# Patient Record
Sex: FEMALE | Race: WHITE | URBAN - METROPOLITAN AREA
[De-identification: names, ages, dates, MRNs, and addresses within clinical notes are randomized per-mention and may not be internally consistent; named-entity substitution may affect disease eponyms.]

---

## 2020-02-18 ENCOUNTER — NEW PATIENT (OUTPATIENT)
Dept: URBAN - METROPOLITAN AREA CLINIC 51 | Facility: CLINIC | Age: 70
End: 2020-02-18
Payer: MEDICARE

## 2020-02-18 DIAGNOSIS — G24.5 BLEPHAROSPASM: ICD-10-CM

## 2020-02-18 DIAGNOSIS — H16.223 KERATOCONJUNCTIVITIS SICCA, BILATERAL: ICD-10-CM

## 2020-02-18 DIAGNOSIS — I10 ESSENTIAL (PRIMARY) HYPERTENSION: ICD-10-CM

## 2020-02-18 PROCEDURE — 76514 ECHO EXAM OF EYE THICKNESS: CPT | Performed by: OPTOMETRIST

## 2020-02-18 PROCEDURE — 92004 COMPRE OPH EXAM NEW PT 1/>: CPT | Performed by: OPTOMETRIST

## 2020-02-18 PROCEDURE — 92133 CPTRZD OPH DX IMG PST SGM ON: CPT | Performed by: OPTOMETRIST

## 2020-02-18 ASSESSMENT — INTRAOCULAR PRESSURE
OS: 26
OS: 19
OD: 22
OD: 24

## 2020-02-18 ASSESSMENT — KERATOMETRY
OS: 44.34
OD: 44.77

## 2020-02-18 ASSESSMENT — VISUAL ACUITY
OD: 20/25
OS: 20/20

## 2021-03-02 ENCOUNTER — FOLLOW UP ESTABLISHED (OUTPATIENT)
Dept: URBAN - METROPOLITAN AREA CLINIC 51 | Facility: CLINIC | Age: 71
End: 2021-03-02
Payer: MEDICARE

## 2021-03-02 DIAGNOSIS — H35.373 PUCKERING OF MACULA, BILATERAL: ICD-10-CM

## 2021-03-02 PROCEDURE — 99214 OFFICE O/P EST MOD 30 MIN: CPT | Performed by: OPTOMETRIST

## 2021-03-02 PROCEDURE — 92133 CPTRZD OPH DX IMG PST SGM ON: CPT | Performed by: OPTOMETRIST

## 2021-03-02 RX ORDER — PREDNISOLONE ACETATE 10 MG/ML
1 % SUSPENSION/ DROPS OPHTHALMIC
Qty: 5 | Refills: 0 | Status: INACTIVE
Start: 2021-03-02 | End: 2021-06-29

## 2021-03-02 ASSESSMENT — KERATOMETRY
OS: 43.87
OD: 45.23

## 2021-03-02 ASSESSMENT — INTRAOCULAR PRESSURE
OD: 19
OS: 20

## 2021-03-02 ASSESSMENT — VISUAL ACUITY
OD: 20/20
OS: 20/20

## 2021-03-30 ENCOUNTER — FOLLOW UP ESTABLISHED (OUTPATIENT)
Dept: URBAN - METROPOLITAN AREA CLINIC 51 | Facility: CLINIC | Age: 71
End: 2021-03-30
Payer: MEDICARE

## 2021-03-30 DIAGNOSIS — H16.123 FILAMENTARY KERATITIS, BILATERAL: Primary | ICD-10-CM

## 2021-03-30 DIAGNOSIS — Z98.890 OTHER SPECIFIED POSTPROCEDURAL STATES: ICD-10-CM

## 2021-03-30 DIAGNOSIS — H52.4 PRESBYOPIA: ICD-10-CM

## 2021-03-30 PROCEDURE — 99214 OFFICE O/P EST MOD 30 MIN: CPT | Performed by: OPTOMETRIST

## 2021-03-30 RX ORDER — CYCLOSPORINE 0.5 MG/ML
0.05 % EMULSION OPHTHALMIC
Qty: 180 | Refills: 3 | Status: ACTIVE
Start: 2021-03-30

## 2021-03-30 ASSESSMENT — INTRAOCULAR PRESSURE
OS: 18
OD: 18

## 2021-06-29 ENCOUNTER — OFFICE VISIT (OUTPATIENT)
Dept: URBAN - METROPOLITAN AREA CLINIC 51 | Facility: CLINIC | Age: 71
End: 2021-06-29
Payer: MEDICARE

## 2021-06-29 DIAGNOSIS — H40.053 OCULAR HYPERTENSION, BILATERAL: ICD-10-CM

## 2021-06-29 PROCEDURE — 99213 OFFICE O/P EST LOW 20 MIN: CPT | Performed by: OPTOMETRIST

## 2021-06-29 ASSESSMENT — INTRAOCULAR PRESSURE
OS: 22
OD: 24

## 2021-06-29 NOTE — IMPRESSION/PLAN
Impression: Filamentary keratitis, bilateral
  Using Systane ointment qhs, Systane ultra 1-5 times a day. Started Restasis after 3/30 appt. Plan: No filaments today. Improvement on SERGIO/comfort with Restasis. Pt received letter from insurance that she will need a PA if she wants refills of Restasis, pt to contact office when refill needed and PA should be sent. Continue Restasis bid OU. Continue ATs tid-qid OU, WC qd, and ointment qhs OU. 
RTC CE 3/2022

## 2022-05-19 ENCOUNTER — OFFICE VISIT (OUTPATIENT)
Dept: URBAN - METROPOLITAN AREA CLINIC 51 | Facility: CLINIC | Age: 72
End: 2022-05-19
Payer: MEDICARE

## 2022-05-19 DIAGNOSIS — Z98.890 OTHER SPECIFIED POSTPROCEDURAL STATES: ICD-10-CM

## 2022-05-19 DIAGNOSIS — H43.313 VITREOUS MEMBRANES AND STRANDS, BILATERAL: ICD-10-CM

## 2022-05-19 DIAGNOSIS — Z96.1 PRESENCE OF INTRAOCULAR LENS: ICD-10-CM

## 2022-05-19 DIAGNOSIS — H52.4 PRESBYOPIA: ICD-10-CM

## 2022-05-19 DIAGNOSIS — H40.053 OCULAR HYPERTENSION, BILATERAL: Primary | ICD-10-CM

## 2022-05-19 DIAGNOSIS — H35.373 PUCKERING OF MACULA, BILATERAL: ICD-10-CM

## 2022-05-19 DIAGNOSIS — H16.123 FILAMENTARY KERATITIS, BILATERAL: ICD-10-CM

## 2022-05-19 PROCEDURE — 92134 CPTRZ OPH DX IMG PST SGM RTA: CPT | Performed by: OPTOMETRIST

## 2022-05-19 PROCEDURE — 92133 CPTRZD OPH DX IMG PST SGM ON: CPT | Performed by: OPTOMETRIST

## 2022-05-19 PROCEDURE — 92014 COMPRE OPH EXAM EST PT 1/>: CPT | Performed by: OPTOMETRIST

## 2022-05-19 PROCEDURE — 92083 EXTENDED VISUAL FIELD XM: CPT | Performed by: OPTOMETRIST

## 2022-05-19 ASSESSMENT — INTRAOCULAR PRESSURE
OD: 20
OS: 19

## 2022-05-19 ASSESSMENT — VISUAL ACUITY
OD: 20/20
OS: 20/20

## 2022-05-19 NOTE — IMPRESSION/PLAN
Impression: Filamentary keratitis, bilateral
  Using Systane ointment qhs, Systane ultra 1-5 times a day. Started Restasis after 3/30 appt. Plan: No filaments today. Improvement on SERGIO/comfort with Restasis. Removed loose hair from OD today in office behind slit lamp without any complications. Recommend the following dry eye regimen:
1) Refresh at least 2-3 times per day or more OU 2) Restasis BID OU Will recheck dryness at next appointment in 6 months

## 2022-05-19 NOTE — IMPRESSION/PLAN
Impression: Puckering of macula, bilateral: H35.373. Plan: ERM w/ no CME. MAC OCT taken. Not having any effect on patients vision. Patient will monitor vision closely and contact our office with any changes/worsening/distortion of vision. Monitor annually with MAC OCT.

## 2022-05-19 NOTE — IMPRESSION/PLAN
Impression: Ocular hypertension, bilateral Plan: IOPs today: 20/19 HVF 24-2c (05/19/2022): scattered pts OU
RNFL OCT (05/19/2022) OD: bdl sup and inf  ;   OS: bdl sup GCA OCT (05/19/2022): abn 2' ERM formation OU Reviewed today's findings. IOP, RNFL and HVF remains stable. Will repeat HVF at next visit for reliability. If HVF stable at next visit, discussed can go to annual visits. Did not start gtts today. 

RTC in 6 months for repeat 24-2c HVF + IOP check

## 2022-11-23 ENCOUNTER — OFFICE VISIT (OUTPATIENT)
Dept: URBAN - METROPOLITAN AREA CLINIC 51 | Facility: CLINIC | Age: 72
End: 2022-11-23
Payer: MEDICARE

## 2022-11-23 DIAGNOSIS — H52.13 MYOPIA, BILATERAL: ICD-10-CM

## 2022-11-23 DIAGNOSIS — H16.123 FILAMENTARY KERATITIS, BILATERAL: ICD-10-CM

## 2022-11-23 DIAGNOSIS — H40.053 OCULAR HYPERTENSION, BILATERAL: Primary | ICD-10-CM

## 2022-11-23 PROCEDURE — 92083 EXTENDED VISUAL FIELD XM: CPT | Performed by: OPTOMETRIST

## 2022-11-23 PROCEDURE — 99213 OFFICE O/P EST LOW 20 MIN: CPT | Performed by: OPTOMETRIST

## 2022-11-23 RX ORDER — CYCLOSPORINE 0.5 MG/ML
0.05 % EMULSION OPHTHALMIC
Qty: 180 | Refills: 3 | Status: ACTIVE
Start: 2022-11-23

## 2022-11-23 ASSESSMENT — INTRAOCULAR PRESSURE
OS: 22
OD: 24

## 2022-11-23 NOTE — IMPRESSION/PLAN
Impression: Filamentary keratitis, bilateral
  Using Systane ointment qhs, Systane ultra 1-5 times a day. Started Restasis after 3/30 appt. Plan: No filaments today. Improvement on SERGIO/comfort with Restasis. Will have patient continue with current at home dry eye regimen. Recommend the following dry eye regimen:
1) Refresh at least 2-3 times per day or more OU 2) Restasis BID OU (ERx'd refills to pharmacy)

## 2022-11-23 NOTE — IMPRESSION/PLAN
Impression: Ocular hypertension, bilateral
HVF 24-2c (05/19/2022): scattered pts OU
RNFL OCT (05/19/2022) OD: bdl sup and inf  ;   OS: bdl sup GCA OCT (05/19/2022): abn 2' ERM formation OU Plan: IOPs today: 24/22 without medications HVF 24-2c (11/23/2022) OD: scattered points ; OS: essentially full Reviewed today's findings. IOPs borderline. Updated HVF obtained today. No treatment is currently necessary. Recommend monitor annually.  

RTC in 1 year for CE + 24-2c HVF + RNFL/GCA OCT

## 2023-07-12 ENCOUNTER — OFFICE VISIT (OUTPATIENT)
Dept: URBAN - METROPOLITAN AREA CLINIC 51 | Facility: CLINIC | Age: 73
End: 2023-07-12
Payer: MEDICARE

## 2023-07-12 DIAGNOSIS — H43.313 VITREOUS MEMBRANES AND STRANDS, BILATERAL: ICD-10-CM

## 2023-07-12 DIAGNOSIS — Z96.1 PRESENCE OF INTRAOCULAR LENS: ICD-10-CM

## 2023-07-12 DIAGNOSIS — H40.053 OCULAR HYPERTENSION, BILATERAL: ICD-10-CM

## 2023-07-12 DIAGNOSIS — H16.123 FILAMENTARY KERATITIS, BILATERAL: Primary | ICD-10-CM

## 2023-07-12 DIAGNOSIS — Z98.890 OTHER SPECIFIED POSTPROCEDURAL STATES: ICD-10-CM

## 2023-07-12 DIAGNOSIS — H35.373 PUCKERING OF MACULA, BILATERAL: ICD-10-CM

## 2023-07-12 PROCEDURE — 92133 CPTRZD OPH DX IMG PST SGM ON: CPT | Performed by: OPTOMETRIST

## 2023-07-12 PROCEDURE — 92014 COMPRE OPH EXAM EST PT 1/>: CPT | Performed by: OPTOMETRIST

## 2023-07-12 PROCEDURE — 92134 CPTRZ OPH DX IMG PST SGM RTA: CPT | Performed by: OPTOMETRIST

## 2023-07-12 ASSESSMENT — VISUAL ACUITY
OS: 20/20
OD: 20/20

## 2023-07-12 ASSESSMENT — KERATOMETRY
OD: 45.00
OS: 43.75

## 2023-07-12 ASSESSMENT — INTRAOCULAR PRESSURE
OD: 22
OS: 24

## 2023-07-12 NOTE — IMPRESSION/PLAN
Impression: Ocular hypertension, bilateral
 Plan: IOPs today: 22/24 without medications RNFL OCT (07/12/2023) OD: sup thin ; OS: inf thin GCA OCT (07/12/2023): abn overall 2' ERM OU Reviewed today's findings. IOPs borderline. Updated RNFL/GCA OCT obtained today. No treatment is currently necessary. Recommend monitor annually.  

RTC in 1 year for CE + RNFL/GCA OCT

## 2023-07-12 NOTE — IMPRESSION/PLAN
Impression: Filamentary keratitis, bilateral
  Using Systane ointment qhs, Systane ultra 1-5 times a day. Started Restasis after 3/30 appt. * no filaments since starting Restasis Plan: No filaments today. Improvement on SERGIO/comfort with Restasis. Will have patient continue with current at home dry eye regimen. Recommend the following dry eye regimen:
1) Refresh at least 2-3 times per day or more OU 2) Restasis BID OU - patient will contact our office when needs refills

## 2023-07-12 NOTE — IMPRESSION/PLAN
Impression: Puckering of macula, bilateral: H35.373. Plan: Bilateral ERM w/ no CME. MAC OCT taken today. Patient will monitor vision closely and contact our office with any changes/worsening/distortion of vision. Monitor annually with MAC OCT.

## 2024-07-18 ENCOUNTER — OFFICE VISIT (OUTPATIENT)
Dept: URBAN - METROPOLITAN AREA CLINIC 51 | Facility: CLINIC | Age: 74
End: 2024-07-18
Payer: MEDICARE

## 2024-07-18 DIAGNOSIS — H40.053 OCULAR HYPERTENSION, BILATERAL: ICD-10-CM

## 2024-07-18 DIAGNOSIS — H52.13 MYOPIA, BILATERAL: ICD-10-CM

## 2024-07-18 DIAGNOSIS — H43.313 VITREOUS MEMBRANES AND STRANDS, BILATERAL: ICD-10-CM

## 2024-07-18 DIAGNOSIS — Z98.890 OTHER SPECIFIED POSTPROCEDURAL STATES: ICD-10-CM

## 2024-07-18 DIAGNOSIS — H35.373 PUCKERING OF MACULA, BILATERAL: ICD-10-CM

## 2024-07-18 DIAGNOSIS — H52.4 PRESBYOPIA: ICD-10-CM

## 2024-07-18 DIAGNOSIS — H16.123 FILAMENTARY KERATITIS, BILATERAL: Primary | ICD-10-CM

## 2024-07-18 DIAGNOSIS — Z96.1 PRESENCE OF INTRAOCULAR LENS: ICD-10-CM

## 2024-07-18 PROCEDURE — 92134 CPTRZ OPH DX IMG PST SGM RTA: CPT | Performed by: OPTOMETRIST

## 2024-07-18 PROCEDURE — 92014 COMPRE OPH EXAM EST PT 1/>: CPT | Performed by: OPTOMETRIST

## 2024-07-18 PROCEDURE — 92133 CPTRZD OPH DX IMG PST SGM ON: CPT | Performed by: OPTOMETRIST

## 2024-07-18 ASSESSMENT — VISUAL ACUITY
OS: 20/20
OD: 20/20

## 2024-07-18 ASSESSMENT — INTRAOCULAR PRESSURE
OS: 22
OD: 22

## 2025-01-09 ENCOUNTER — APPOINTMENT (OUTPATIENT)
Dept: URBAN - METROPOLITAN AREA CLINIC 141 | Facility: CLINIC | Age: 75
Setting detail: DERMATOLOGY
End: 2025-01-09

## 2025-01-09 DIAGNOSIS — D22 MELANOCYTIC NEVI: ICD-10-CM

## 2025-01-09 DIAGNOSIS — L81.4 OTHER MELANIN HYPERPIGMENTATION: ICD-10-CM

## 2025-01-09 DIAGNOSIS — L57.0 ACTINIC KERATOSIS: ICD-10-CM

## 2025-01-09 DIAGNOSIS — L82.1 OTHER SEBORRHEIC KERATOSIS: ICD-10-CM

## 2025-01-09 DIAGNOSIS — D18.0 HEMANGIOMA: ICD-10-CM

## 2025-01-09 DIAGNOSIS — L57.8 OTHER SKIN CHANGES DUE TO CHRONIC EXPOSURE TO NONIONIZING RADIATION: ICD-10-CM | Status: STABLE

## 2025-01-09 DIAGNOSIS — Z71.89 OTHER SPECIFIED COUNSELING: ICD-10-CM

## 2025-01-09 PROBLEM — D22.5 MELANOCYTIC NEVI OF TRUNK: Status: ACTIVE | Noted: 2025-01-09

## 2025-01-09 PROBLEM — D18.01 HEMANGIOMA OF SKIN AND SUBCUTANEOUS TISSUE: Status: ACTIVE | Noted: 2025-01-09

## 2025-01-09 PROCEDURE — ? LIQUID NITROGEN

## 2025-01-09 PROCEDURE — 17003 DESTRUCT PREMALG LES 2-14: CPT

## 2025-01-09 PROCEDURE — ? ADDITIONAL NOTES

## 2025-01-09 PROCEDURE — 17000 DESTRUCT PREMALG LESION: CPT

## 2025-01-09 PROCEDURE — ? COUNSELING

## 2025-01-09 PROCEDURE — 99203 OFFICE O/P NEW LOW 30 MIN: CPT | Mod: 25

## 2025-01-09 ASSESSMENT — LOCATION SIMPLE DESCRIPTION DERM
LOCATION SIMPLE: UPPER BACK
LOCATION SIMPLE: RIGHT CALF
LOCATION SIMPLE: CHEST
LOCATION SIMPLE: LEFT UPPER BACK
LOCATION SIMPLE: RIGHT FOREARM

## 2025-01-09 ASSESSMENT — LOCATION DETAILED DESCRIPTION DERM
LOCATION DETAILED: SUPERIOR THORACIC SPINE
LOCATION DETAILED: MIDDLE STERNUM
LOCATION DETAILED: RIGHT DISTAL CALF
LOCATION DETAILED: LEFT MEDIAL SUPERIOR CHEST
LOCATION DETAILED: RIGHT DISTAL DORSAL FOREARM
LOCATION DETAILED: RIGHT LATERAL SUPERIOR CHEST
LOCATION DETAILED: LEFT MEDIAL UPPER BACK

## 2025-01-09 ASSESSMENT — LOCATION ZONE DERM
LOCATION ZONE: LEG
LOCATION ZONE: ARM
LOCATION ZONE: TRUNK

## 2025-01-09 NOTE — PROCEDURE: LIQUID NITROGEN
Render Post-Care Instructions In Note?: no
Detail Level: Detailed
Number Of Freeze-Thaw Cycles: 1 freeze-thaw cycle
Post-Care Instructions: I reviewed with the patient in detail post-care instructions. Patient is to wear sunprotection, and avoid picking at any of the treated lesions. Pt may apply Vaseline to crusted or scabbing areas.
Duration Of Freeze Thaw-Cycle (Seconds): 3
Application Tool (Optional): Liquid Nitrogen Sprayer
Show Applicator Variable?: Yes
Consent: The patient's consent was obtained including but not limited to risks of crusting, scabbing, blistering, scarring, darker or lighter pigmentary change, recurrence, incomplete removal and infection.

## 2025-01-09 NOTE — PROCEDURE: ADDITIONAL NOTES
Render Risk Assessment In Note?: yes
Additional Notes: If the spot on the R calf doesn’t resolve, may consider a bx
Detail Level: Simple